# Patient Record
Sex: FEMALE | Race: WHITE
[De-identification: names, ages, dates, MRNs, and addresses within clinical notes are randomized per-mention and may not be internally consistent; named-entity substitution may affect disease eponyms.]

---

## 2017-04-20 ENCOUNTER — HOSPITAL ENCOUNTER (OUTPATIENT)
Dept: HOSPITAL 58 - RAD | Age: 57
Discharge: HOME | End: 2017-04-20
Attending: INTERNAL MEDICINE

## 2017-04-20 DIAGNOSIS — M25.512: ICD-10-CM

## 2017-04-20 DIAGNOSIS — M54.2: Primary | ICD-10-CM

## 2017-04-20 NOTE — DI
EXAM:  Cervical spine three view 

  

HISTORY:  Pain 

  

COMPARISON:  None 

  

TECHNIQUE:  Three views cervical spine were performed 

  

FINDINGS:  Vertebral bodies normal height.  No fracture.  No subluxation.  Intervertebral disc space
s maintained.  Small marginal osteophyte formation.  Mild multileve uncovertebral hypertrophy.  Prev
ertebral soft tissues appear normal. 

  

IMPRESSION:  Mild degenerative changes.

## 2017-04-20 NOTE — DI
EXAM:  Radiographs, left shoulder 

  

HISTORY:  Left shoulder pain. 

  

COMPARISON:  None available. 

  

TECHNIQUE:  Three views. 

  

  

FINDINGS:  Bone mineralization is decreased.  There is no fracture or dislocation.  The joint spaces
 are maintained.  No focal soft tissue abnormality is seen. 

  

---------------------------- 

IMPRESSION: 

No acute abnormality of the left shoulder.

## 2017-05-30 ENCOUNTER — HOSPITAL ENCOUNTER (OUTPATIENT)
Dept: HOSPITAL 58 - REHAB | Age: 57
LOS: 1 days | End: 2017-05-31
Attending: INTERNAL MEDICINE

## 2017-05-30 DIAGNOSIS — M54.12: Primary | ICD-10-CM

## 2017-05-30 DIAGNOSIS — M54.30: ICD-10-CM

## 2017-05-30 DIAGNOSIS — M54.2: ICD-10-CM

## 2017-06-01 NOTE — [id number]
Date of Note: 05/30/17


Visit #: 1


Date of Evaluation: 05/30/17


Payer Source: Insurance


Treatment Diagnosis: Neck pain and left radiating symptoms


History of Condition/Mechanism of Injury:: Patient states neck pain started in 

early April 2017, without any know injury.  States she has had sciatica 

symptoms since 2007.


Prior Level of Function.....Patient was independent with: ADL's, Self Care, Work

/Vocation, Caregiving, Ambulation/Mobility, Community Integration/Access


Level of Function: Reports difficulty with driving and reading due to neck pain.


Functional Limitations: Sleep, ADL's, Reaching, Lifting


Current Subjective/complaints:: Patient reports pain in the neck and left 

shoulder area.  States pain in the left UE is constant.  Reports left shoulder 

pain increases with ROM.  Also states she cannot lay on the left shoulder.  

Reports no symptoms in the right UE. She denies tingling or numbness in the 

Left UE.  States she has severe headaches that seem to start at the left 

shoulder and go up to the base of the head.  States she does not have much  

in the left hand.  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 

able to lift 50 lbs. She also has to be able to carry supplies. States she will 

have to pass a test before returning to work for lifting and gripping.


Treatment Side (optional): Left





Medical History


Smoking Status: Current some day smoker


Hx Home Medications: Gabapentin


Patient's Goals: Her goal is to get relief of neck and left UE pain.





Pain Assessment





- Pain Description


Pain Location: left side of neck and left shoulder


Current Pain Intensity: 9/10


Worst Pain Intensity: 15/10





Functional Outcome Measure


Neck Disability Index: 56





- G Codes & Severity Modifier


G Codes & Modifier: NA


Source of G Code score: NA





Observation





- Observation


Posture: Forward Head, Rounded Shoulders


Handedness: Right


Shoulder ROM: Right WFL's


Shoulder Muscle Strength: Right WFL's





- Left Shoulder ROM


Comments: Left shoulder flexion: actively 92 degrees, passively 85-90 degrees.  

shoulder abduction 70 degrees actively, 90 degrees passively.  ER actively to 45

-50 degrees, IR WFL's .  All ROM is limited by pain.  Left elbow, wrist, and 

hand AROM is WFL's.  Cervical spine AROM is WFL's.





- Left Shoulder Strength


Comments: Patient exhibits general weakness with MMT of left UE.  She 

demonstrates generally 4-/5.





- Special Tests


Shoulder Empty Can (Supraspinatus) Test: Positive Left


Shoulder Speed's Sign Test: Negative Left


Shoulder Drop Arm Test: Negative Left


Shoulder Alberto-Donato Impingement Test: Negative Left





 Strength


Left Hand  Strength: 10 lbs.  


Right Hand  Strength: 75 lbs.


Dynamometer Testing Position: 2nd Position





Palpation


Comments:: Patient reports tenderness along the left upper traps and left lower 

cervical paraspinals.  Also reports tenderness over the long head of the biceps 

tendon. Demonstrates minimal increased muscle tone along bilateral cervical 

paraspinals and upper traps.





Sensation





- Sensation


Right Upper Extremity: Intact/Normal


Left Upper Extremity: Intact/Normal





- Treatment


Modality: Ultrasound


Parameters/Method Applied: 1.6 w/cm2 continuous X 10 mins to left upper traps 

and cervical paraspinals.


Patient Position: Sitting





Interventions





- Exercise/Activities/Manual Therapy


Exercises/Activities: Instructed patient in pendulum exercise, scapular 

retraction, and cervical stretching into lateral flexion and rotation.


Manual Therapy: NA


HOME EXERCISE PROGRAM: pendulum exercise, scapular retraction, and cervical 

stretching into lateral flexion and rotation.





- Charges


Total Direct Minutes: 45 mins


Total Treatment Time: 45 mins


Procedures billed for this date of service:: Gaston Mckeon, 





Assessment


Assessment: Patient presents with a diagnosis of cervical radiculopathy and 

cervicalgia.  She reports neck and left UE pain.  She exhibits limited left 

shoulder AROM due to reports of pain. Upon MMT the left UE, she demonstrates 

generalized weakness and presents a submaximal effort with testing left  

strength.  Left shoulder Supraspinatus test ilicits pain, but Impingement test 

does not.  It is difficult to determine her source of pain, or to determine if 

it may be overlapping from the neck and shoulder.  She demonstrates potential 

to benefit from modalities, exercises, and education to reduce her symptoms and 

be able to return to her prior level of function.


Patient Education: Education of diagnosis, Body/Joint mechanics, Home Exercise 

Program, Education of Plan of Care


Rehab Potential: Good





Short Term Goals


Goal #1: Patient independent in basic HEP.


Goal to be met by: 06/15/17


Goal #2: Left UE pain frequency decreased to less than constant.


Goal to be met by: 06/15/17


Goal #3: Left shoulder AROM WFL's.


Goal to be met by: 06/15/17


Goal #4: Tenderness at the left upper traps decreased to minimal.


Goal to be met by: 06/15/17





Long Term Goals


Goal #1: Pt knows HEP and to continue ex's to maintain functional level at D/C.


Goal to be met by: 07/11/17


Goal #2: Neck Disability index improved to 20.


Goal to be met by: 07/11/17


Goal #3: Pt will perform all ADL's & functional activities w/o right UE or neck 

pain


Goal to be met by: 07/11/17


Goal #4: Pt will return to work with minimal limitations.


Goal to be met by: 07/11/17





Plan





- Treatment to be Provided


Procedures: Therapeutic Exercises, Therapeutic Activity, Manual Therapy, 

Patient Education


Modalities: Electrical Stimulation, Ultrasound/Phonophoresis, Cryotherapy, Hot 

Packs, Mechanical Traction (cervical)





- Treatment Plan


Frequency: 3 X week


Duration: 4 weeks


ORDER # VISITS AND/OR THROUGH DATE: 7/11/17





- Treatment Code


(1) Arm pain


Qualifiers: 


     Laterality: left     Qualified Description: Pain of left upper extremity  

     Qualifier Code(s): (M79.602) Pain in left arm  





(2) Neck pain


Comments: 


M54.2








(3) Cervical radiculopathy


Comments: 


M54.12

## 2018-04-17 ENCOUNTER — HOSPITAL ENCOUNTER (OUTPATIENT)
Dept: HOSPITAL 58 - RAD | Age: 58
Discharge: HOME | End: 2018-04-17
Attending: INTERNAL MEDICINE

## 2018-04-17 DIAGNOSIS — R20.0: ICD-10-CM

## 2018-04-17 DIAGNOSIS — M54.9: Primary | ICD-10-CM

## 2018-04-18 NOTE — MRI
EXAM:  Lumbar spine MRI without contrast. 

  

HISTORY:  Back pain and bilateral leg numbness. 

  

COMPARISON:  Lumbar spine radiographs 07/10/2015 and lumbar spine MRI 10/04/2012. 

  

TECHNIQUE:  Multiplanar, multisequence MR images were acquired lumbar spine without contrast. 

  

FINDINGS:  Conus medullaris ends at L1-2 is normal configuration and signal intensity.  The lumbar ve
rtebra are normal in height, AP alignment and intrinsic bone marrow signal.  There is minor lumbar ve
ntral spondylosis with reactive fatty marrow change along the anterior superior endplates at L3 and L
5.  Benign intraosseous hemangiomas are present at L1, L2, L3 and L4. 

  

The partially visualized liver, spleen and kidneys are unremarkable.  There is mild central intrahepa
tic biliary ductal dilatation and mild dilatation of the common bile duct which tapers to the duodenu
m in this postcholecystectomy patient.  There are no paravertebral masses.  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
 cyst. 

  

T12-L1, L1-2, L2-3:  The intervertebral discs are normal. 

  

L3-4:  There is a minor disc bulge that minimally narrows the inferior right neural foramen. 

  

L4-5:  There is a minor disc bulge that minimally narrows the inferior neural foramina bilaterally an
d mild bilateral facet arthropathy and ligamentum flavum hypertrophy.  This causes mild bilateral for
aminal stenosis.  There is no central canal stenosis. 

  

L5-S1:  The intervertebral disc is normal. 

  

IMPRESSION: 

1.  No change minimal lumbar degenerative spondylosis and mild facet arthropathy at L4-5. 

2.  No lumbar disc herniations, central canal stenosis or pars interarticularis defects.

## 2018-10-20 ENCOUNTER — HOSPITAL ENCOUNTER (OUTPATIENT)
Dept: HOSPITAL 58 - AMBL | Age: 58
Discharge: ANOTHER HOSPITAL | End: 2018-10-20
Attending: INTERNAL MEDICINE

## 2018-10-20 DIAGNOSIS — R05: ICD-10-CM

## 2018-10-20 DIAGNOSIS — R06.03: ICD-10-CM

## 2018-10-20 DIAGNOSIS — R09.89: ICD-10-CM

## 2018-10-20 DIAGNOSIS — R00.1: ICD-10-CM

## 2018-10-20 DIAGNOSIS — R07.9: Primary | ICD-10-CM
